# Patient Record
Sex: FEMALE | Race: WHITE | NOT HISPANIC OR LATINO | Employment: UNEMPLOYED | URBAN - METROPOLITAN AREA
[De-identification: names, ages, dates, MRNs, and addresses within clinical notes are randomized per-mention and may not be internally consistent; named-entity substitution may affect disease eponyms.]

---

## 2017-02-12 ENCOUNTER — HOSPITAL ENCOUNTER (INPATIENT)
Facility: MEDICAL CENTER | Age: 22
LOS: 4 days | DRG: 778 | End: 2017-02-17
Attending: OBSTETRICS & GYNECOLOGY | Admitting: OBSTETRICS & GYNECOLOGY
Payer: COMMERCIAL

## 2017-02-12 LAB
A1 MICROGLOB PLACENTAL VAG QL: NEGATIVE
ABO GROUP BLD: NORMAL
ALBUMIN SERPL BCP-MCNC: 3.5 G/DL (ref 3.2–4.9)
ALBUMIN/GLOB SERPL: 1.1 G/DL
ALP SERPL-CCNC: 144 U/L (ref 30–99)
ALT SERPL-CCNC: 6 U/L (ref 2–50)
ANION GAP SERPL CALC-SCNC: 16 MMOL/L (ref 0–11.9)
AST SERPL-CCNC: 12 U/L (ref 12–45)
BILIRUB SERPL-MCNC: 0.9 MG/DL (ref 0.1–1.5)
BLD GP AB SCN SERPL QL: NORMAL
BUN SERPL-MCNC: 4 MG/DL (ref 8–22)
CALCIUM SERPL-MCNC: 9 MG/DL (ref 8.5–10.5)
CHLORIDE SERPL-SCNC: 104 MMOL/L (ref 96–112)
CO2 SERPL-SCNC: 13 MMOL/L (ref 20–33)
CREAT SERPL-MCNC: 0.67 MG/DL (ref 0.5–1.4)
ERYTHROCYTE [DISTWIDTH] IN BLOOD BY AUTOMATED COUNT: 43.9 FL (ref 35.9–50)
GFR SERPL CREATININE-BSD FRML MDRD: >60 ML/MIN/1.73 M 2
GLOBULIN SER CALC-MCNC: 3.3 G/DL (ref 1.9–3.5)
GLUCOSE SERPL-MCNC: 79 MG/DL (ref 65–99)
HCT VFR BLD AUTO: 34.9 % (ref 37–47)
HGB BLD-MCNC: 10.7 G/DL (ref 12–16)
MAGNESIUM SERPL-MCNC: 4.8 MG/DL (ref 1.5–2.5)
MAGNESIUM SERPL-MCNC: 5.2 MG/DL (ref 1.5–2.5)
MCH RBC QN AUTO: 27.4 PG (ref 27–33)
MCHC RBC AUTO-ENTMCNC: 30.7 G/DL (ref 33.6–35)
MCV RBC AUTO: 89.5 FL (ref 81.4–97.8)
PLATELET # BLD AUTO: 149 K/UL (ref 164–446)
PMV BLD AUTO: 11.6 FL (ref 9–12.9)
POTASSIUM SERPL-SCNC: 4.1 MMOL/L (ref 3.6–5.5)
PROT SERPL-MCNC: 6.8 G/DL (ref 6–8.2)
RBC # BLD AUTO: 3.9 M/UL (ref 4.2–5.4)
RH BLD: NORMAL
SODIUM SERPL-SCNC: 133 MMOL/L (ref 135–145)
WBC # BLD AUTO: 17.1 K/UL (ref 4.8–10.8)

## 2017-02-12 PROCEDURE — 700112 HCHG RX REV CODE 229: Performed by: OBSTETRICS & GYNECOLOGY

## 2017-02-12 PROCEDURE — 700111 HCHG RX REV CODE 636 W/ 250 OVERRIDE (IP)

## 2017-02-12 PROCEDURE — 86900 BLOOD TYPING SEROLOGIC ABO: CPT

## 2017-02-12 PROCEDURE — 80053 COMPREHEN METABOLIC PANEL: CPT

## 2017-02-12 PROCEDURE — 85027 COMPLETE CBC AUTOMATED: CPT

## 2017-02-12 PROCEDURE — 36415 COLL VENOUS BLD VENIPUNCTURE: CPT

## 2017-02-12 PROCEDURE — 84112 EVAL AMNIOTIC FLUID PROTEIN: CPT

## 2017-02-12 PROCEDURE — A9270 NON-COVERED ITEM OR SERVICE: HCPCS | Performed by: OBSTETRICS & GYNECOLOGY

## 2017-02-12 PROCEDURE — 86901 BLOOD TYPING SEROLOGIC RH(D): CPT

## 2017-02-12 PROCEDURE — 59025 FETAL NON-STRESS TEST: CPT | Performed by: OBSTETRICS & GYNECOLOGY

## 2017-02-12 PROCEDURE — 83735 ASSAY OF MAGNESIUM: CPT | Mod: 91

## 2017-02-12 PROCEDURE — 86850 RBC ANTIBODY SCREEN: CPT

## 2017-02-12 PROCEDURE — 4A1HX4Z MONITORING OF PRODUCTS OF CONCEPTION, CARDIAC ELECTRICAL ACTIVITY, EXTERNAL APPROACH: ICD-10-PCS | Performed by: OBSTETRICS & GYNECOLOGY

## 2017-02-12 PROCEDURE — 700111 HCHG RX REV CODE 636 W/ 250 OVERRIDE (IP): Performed by: OBSTETRICS & GYNECOLOGY

## 2017-02-12 PROCEDURE — 700102 HCHG RX REV CODE 250 W/ 637 OVERRIDE(OP): Performed by: OBSTETRICS & GYNECOLOGY

## 2017-02-12 PROCEDURE — 302790 HCHG STAT ANTEPARTUM CARE, DAILY

## 2017-02-12 RX ORDER — VITAMIN A ACETATE, BETA CAROTENE, ASCORBIC ACID, CHOLECALCIFEROL, .ALPHA.-TOCOPHEROL ACETATE, DL-, THIAMINE MONONITRATE, RIBOFLAVIN, NIACINAMIDE, PYRIDOXINE HYDROCHLORIDE, FOLIC ACID, CYANOCOBALAMIN, CALCIUM CARBONATE, FERROUS FUMARATE, ZINC OXIDE, CUPRIC OXIDE 3080; 12; 120; 400; 1; 1.84; 3; 20; 22; 920; 25; 200; 27; 10; 2 [IU]/1; UG/1; MG/1; [IU]/1; MG/1; MG/1; MG/1; MG/1; MG/1; [IU]/1; MG/1; MG/1; MG/1; MG/1; MG/1
1 TABLET, FILM COATED ORAL DAILY
Status: DISCONTINUED | OUTPATIENT
Start: 2017-02-12 | End: 2017-02-17 | Stop reason: HOSPADM

## 2017-02-12 RX ORDER — AMOXICILLIN 250 MG
1 CAPSULE ORAL 2 TIMES DAILY
Status: DISCONTINUED | OUTPATIENT
Start: 2017-02-12 | End: 2017-02-17 | Stop reason: HOSPADM

## 2017-02-12 RX ORDER — DOCUSATE SODIUM 100 MG/1
100 CAPSULE, LIQUID FILLED ORAL 2 TIMES DAILY
Status: DISCONTINUED | OUTPATIENT
Start: 2017-02-12 | End: 2017-02-17 | Stop reason: HOSPADM

## 2017-02-12 RX ORDER — AMPICILLIN 2 G/1
INJECTION, POWDER, FOR SOLUTION INTRAVENOUS
Status: COMPLETED
Start: 2017-02-12 | End: 2017-02-12

## 2017-02-12 RX ORDER — MAGNESIUM SULFATE HEPTAHYDRATE 40 MG/ML
2.5 INJECTION, SOLUTION INTRAVENOUS CONTINUOUS
Status: DISCONTINUED | OUTPATIENT
Start: 2017-02-12 | End: 2017-02-14

## 2017-02-12 RX ORDER — MAGNESIUM SULFATE HEPTAHYDRATE 40 MG/ML
2 INJECTION, SOLUTION INTRAVENOUS ONCE
Status: COMPLETED | OUTPATIENT
Start: 2017-02-12 | End: 2017-02-12

## 2017-02-12 RX ORDER — MAGNESIUM SULFATE HEPTAHYDRATE 40 MG/ML
INJECTION, SOLUTION INTRAVENOUS
Status: COMPLETED
Start: 2017-02-12 | End: 2017-02-12

## 2017-02-12 RX ORDER — ACETAMINOPHEN 325 MG/1
650 TABLET ORAL EVERY 4 HOURS PRN
Status: DISCONTINUED | OUTPATIENT
Start: 2017-02-12 | End: 2017-02-17 | Stop reason: HOSPADM

## 2017-02-12 RX ORDER — BETAMETHASONE SODIUM PHOSPHATE AND BETAMETHASONE ACETATE 3; 3 MG/ML; MG/ML
12 INJECTION, SUSPENSION INTRA-ARTICULAR; INTRALESIONAL; INTRAMUSCULAR; SOFT TISSUE ONCE
Status: COMPLETED | OUTPATIENT
Start: 2017-02-12 | End: 2017-02-12

## 2017-02-12 RX ORDER — SODIUM CHLORIDE, SODIUM LACTATE, POTASSIUM CHLORIDE, CALCIUM CHLORIDE 600; 310; 30; 20 MG/100ML; MG/100ML; MG/100ML; MG/100ML
INJECTION, SOLUTION INTRAVENOUS CONTINUOUS
Status: DISCONTINUED | OUTPATIENT
Start: 2017-02-12 | End: 2017-02-17 | Stop reason: HOSPADM

## 2017-02-12 RX ORDER — AMPICILLIN 2 G/1
2 INJECTION, POWDER, FOR SOLUTION INTRAVENOUS EVERY 6 HOURS
Status: DISCONTINUED | OUTPATIENT
Start: 2017-02-13 | End: 2017-02-14

## 2017-02-12 RX ORDER — CALCIUM GLUCONATE 94 MG/ML
1 INJECTION, SOLUTION INTRAVENOUS PRN
Status: DISCONTINUED | OUTPATIENT
Start: 2017-02-12 | End: 2017-02-15

## 2017-02-12 RX ORDER — SODIUM CHLORIDE, SODIUM LACTATE, POTASSIUM CHLORIDE, CALCIUM CHLORIDE 600; 310; 30; 20 MG/100ML; MG/100ML; MG/100ML; MG/100ML
INJECTION, SOLUTION INTRAVENOUS
Status: COMPLETED
Start: 2017-02-12 | End: 2017-02-12

## 2017-02-12 RX ORDER — ONDANSETRON 2 MG/ML
4 INJECTION INTRAMUSCULAR; INTRAVENOUS EVERY 6 HOURS PRN
Status: DISCONTINUED | OUTPATIENT
Start: 2017-02-12 | End: 2017-02-17 | Stop reason: HOSPADM

## 2017-02-12 RX ORDER — NIFEDIPINE 10 MG/1
10 CAPSULE ORAL EVERY 6 HOURS PRN
COMMUNITY

## 2017-02-12 RX ADMIN — MAGNESIUM SULFATE IN WATER 2.5 G/HR: 40 INJECTION, SOLUTION INTRAVENOUS at 17:11

## 2017-02-12 RX ADMIN — DOCUSATE SODIUM 100 MG: 100 CAPSULE ORAL at 21:17

## 2017-02-12 RX ADMIN — SODIUM CHLORIDE, POTASSIUM CHLORIDE, SODIUM LACTATE AND CALCIUM CHLORIDE: 600; 310; 30; 20 INJECTION, SOLUTION INTRAVENOUS at 17:10

## 2017-02-12 RX ADMIN — ONDANSETRON 4 MG: 2 INJECTION, SOLUTION INTRAMUSCULAR; INTRAVENOUS at 17:33

## 2017-02-12 RX ADMIN — AMPICILLIN SODIUM 2 G: 2 INJECTION, POWDER, FOR SOLUTION INTRAMUSCULAR; INTRAVENOUS at 17:48

## 2017-02-12 RX ADMIN — STANDARDIZED SENNA CONCENTRATE AND DOCUSATE SODIUM 1 TABLET: 8.6; 5 TABLET, FILM COATED ORAL at 21:16

## 2017-02-12 RX ADMIN — AMPICILLIN SODIUM 2 G: 2 INJECTION, POWDER, FOR SOLUTION INTRAMUSCULAR; INTRAVENOUS at 23:56

## 2017-02-12 RX ADMIN — BETAMETHASONE SODIUM PHOSPHATE AND BETAMETHASONE ACETATE 12 MG: 3; 3 INJECTION, SUSPENSION INTRA-ARTICULAR; INTRALESIONAL; INTRAMUSCULAR at 23:02

## 2017-02-12 RX ADMIN — MAGNESIUM SULFATE IN WATER 2 G: 40 INJECTION, SOLUTION INTRAVENOUS at 16:50

## 2017-02-12 RX ADMIN — MAGNESIUM SULFATE IN WATER 2 G: 40 INJECTION, SOLUTION INTRAVENOUS at 16:48

## 2017-02-12 RX ADMIN — MAGNESIUM SULFATE IN WATER 2.5 G/HR: 40 INJECTION, SOLUTION INTRAVENOUS at 23:58

## 2017-02-12 ASSESSMENT — LIFESTYLE VARIABLES
EVER_SMOKED: NEVER
DO YOU DRINK ALCOHOL: NO
ALCOHOL_USE: NO

## 2017-02-12 ASSESSMENT — PAIN SCALES - GENERAL
PAINLEVEL_OUTOF10: 2
PAINLEVEL_OUTOF10: 3

## 2017-02-13 LAB
MAGNESIUM SERPL-MCNC: 5.3 MG/DL (ref 1.5–2.5)
MAGNESIUM SERPL-MCNC: 5.4 MG/DL (ref 1.5–2.5)
MAGNESIUM SERPL-MCNC: 5.7 MG/DL (ref 1.5–2.5)

## 2017-02-13 PROCEDURE — 59025 FETAL NON-STRESS TEST: CPT | Performed by: OBSTETRICS & GYNECOLOGY

## 2017-02-13 PROCEDURE — 83735 ASSAY OF MAGNESIUM: CPT | Mod: 91

## 2017-02-13 PROCEDURE — 700102 HCHG RX REV CODE 250 W/ 637 OVERRIDE(OP): Performed by: OBSTETRICS & GYNECOLOGY

## 2017-02-13 PROCEDURE — 36415 COLL VENOUS BLD VENIPUNCTURE: CPT

## 2017-02-13 PROCEDURE — 700112 HCHG RX REV CODE 229: Performed by: OBSTETRICS & GYNECOLOGY

## 2017-02-13 PROCEDURE — 770002 HCHG ROOM/CARE - OB PRIVATE (112)

## 2017-02-13 PROCEDURE — A9270 NON-COVERED ITEM OR SERVICE: HCPCS | Performed by: OBSTETRICS & GYNECOLOGY

## 2017-02-13 PROCEDURE — 700111 HCHG RX REV CODE 636 W/ 250 OVERRIDE (IP): Performed by: OBSTETRICS & GYNECOLOGY

## 2017-02-13 PROCEDURE — 302790 HCHG STAT ANTEPARTUM CARE, DAILY

## 2017-02-13 RX ORDER — NIFEDIPINE 10 MG/1
10 CAPSULE ORAL EVERY 6 HOURS
Status: DISCONTINUED | OUTPATIENT
Start: 2017-02-13 | End: 2017-02-13

## 2017-02-13 RX ORDER — NIFEDIPINE 10 MG/1
10 CAPSULE ORAL EVERY 6 HOURS
Status: DISCONTINUED | OUTPATIENT
Start: 2017-02-14 | End: 2017-02-17 | Stop reason: HOSPADM

## 2017-02-13 RX ADMIN — Medication 1 TABLET: at 08:54

## 2017-02-13 RX ADMIN — DOCUSATE SODIUM 100 MG: 100 CAPSULE ORAL at 21:32

## 2017-02-13 RX ADMIN — STANDARDIZED SENNA CONCENTRATE AND DOCUSATE SODIUM 1 TABLET: 8.6; 5 TABLET, FILM COATED ORAL at 21:33

## 2017-02-13 RX ADMIN — STANDARDIZED SENNA CONCENTRATE AND DOCUSATE SODIUM 1 TABLET: 8.6; 5 TABLET, FILM COATED ORAL at 09:01

## 2017-02-13 RX ADMIN — MAGNESIUM SULFATE IN WATER 2.5 G/HR: 40 INJECTION, SOLUTION INTRAVENOUS at 07:46

## 2017-02-13 RX ADMIN — AMPICILLIN SODIUM 2 G: 2 INJECTION, POWDER, FOR SOLUTION INTRAMUSCULAR; INTRAVENOUS at 12:10

## 2017-02-13 RX ADMIN — AMPICILLIN SODIUM 2 G: 2 INJECTION, POWDER, FOR SOLUTION INTRAMUSCULAR; INTRAVENOUS at 18:00

## 2017-02-13 RX ADMIN — SODIUM CHLORIDE, POTASSIUM CHLORIDE, SODIUM LACTATE AND CALCIUM CHLORIDE: 600; 310; 30; 20 INJECTION, SOLUTION INTRAVENOUS at 19:50

## 2017-02-13 RX ADMIN — SODIUM CHLORIDE, POTASSIUM CHLORIDE, SODIUM LACTATE AND CALCIUM CHLORIDE: 600; 310; 30; 20 INJECTION, SOLUTION INTRAVENOUS at 05:57

## 2017-02-13 RX ADMIN — DOCUSATE SODIUM 100 MG: 100 CAPSULE ORAL at 08:54

## 2017-02-13 RX ADMIN — AMPICILLIN SODIUM 2 G: 2 INJECTION, POWDER, FOR SOLUTION INTRAMUSCULAR; INTRAVENOUS at 06:00

## 2017-02-13 RX ADMIN — MAGNESIUM SULFATE IN WATER 2.5 G/HR: 40 INJECTION, SOLUTION INTRAVENOUS at 15:48

## 2017-02-13 RX ADMIN — ASPIRIN 81 MG: 81 TABLET ORAL at 08:54

## 2017-02-13 ASSESSMENT — PAIN SCALES - GENERAL: PAINLEVEL_OUTOF10: 0

## 2017-02-13 NOTE — CARE PLAN
Problem: Knowledge Deficit  Goal: Patient/Support Person demonstrates understanding regarding condition, prognosis and treatment needs during the pregnancy  Outcome: PROGRESSING AS EXPECTED  32 4/7 weeks gestation, twins, admitted for PTL.  Questions encouraged/answered.  POC discussed.  Pt and FOB verbalized understanding.    Problem: Risk for Fluid Imbalance  Goal: Promotion of Fluid Balance  Outcome: PROGRESSING SLOWER THAN EXPECTED  Twin gestation, on Magnesium Sulfate for PTL.  History of severe preeclampsia.  Strict I&O monitored.

## 2017-02-13 NOTE — H&P
HISTORY OF PRESENT ILLNESS:  The patient is a 21-year-old  3, para   1-1-0-2 with an estimated date of confinement of 2017 making her 32-4/7   weeks gestational age at the time of her transport from [place].  She is   being transported to [facility] by her obstetrician, Dr. Owen Guevara because of  contractions and cervical change in a diamniotic   dichorionic twin pregnancy.  She is being transported to [facility] in case the   babies were to deliver and they would need the  intensive care unit.    I have been following the patient essentially every 4 weeks with Dr. Guevara.    I saw her last on 2017 at which time the twins were concordant in   growth.  I do not have my ultrasound report at the time of this dictation.    She presented to [facility] at approximately 9:00 p.m. last night   complaining of contractions for several hours.  She had been admitted there at   approximately 28 weeks for the same thing and was eventually sent home on   nifedipine to take as needed for contractions; however, she has not been   taking any nifedipine since that time and did not take any on the evening of   admission.  She was not having contractions on the monitor at that time, she   was admitted; however, a fetal fibronectin returned positive and AmniSure was   negative.  Because the patient was complaining of contractions.  She was given   oral nifedipine 20 mg at the time of admission and placed on 10 mg every 6   hours.  She was given betamethasone at 11:00 p.m. on .  Today,   she has been showing some intermittent contractions and has received an   occasional subQ dose of terbutaline.  Her pelvic exam on admission at [facility] was described as 2 cm dilated, posterior, 20% effaced and vertex at a   -3 station, essentially that exam has not changed, but the patient started   alexandra more and had a little pinkish discharge.  Therefore, she is  being   transferred to [place].    PAST MEDICAL HISTORY:  Significant for a vaginal delivery in  of a male   infant weighing 7 pounds 5 ounces at 37 weeks.  In 2016, she was   transported to [facility] because of severe preeclampsia and was delivered here at   30 weeks' gestational age by  section, a female infant that weighed 2   pounds 8 ounces, but is doing well at this time.  She also a medical history   significant for bipolar depression diagnosed by her psychiatrist, Dr. Prado.    She had been on Prozac, but did not tolerate this well and she did stop it at   the time, she became pregnant.  Dr. Prado recommended she be placed on   Lamictal for this postpartum.    SOCIAL HISTORY:  She is .  The  and the patient's parents do   appear to be involved and supportive.  She is a housewife.  She denies   smoking, drinking, or other illicit drug use.    PRESENT MEDICATIONS:  Prenatal vitamins, folic acid, and aspirin 81 mg per   day.    PRENATAL LABORATORY DATA:  Hematocrit 34.9.  White count at [facility] is 17,000,   although it was 8900 on admission in [place], the elevation is probably   secondary to receiving betamethasone, platelet count 112 in [place] and is   149 on admission here.  She is GBS positive in her urine at the time of her   initial prenatal labs.  She had an abnormal 1-hour Glucola, but a normal   3-hour glucose tolerance test.  Hepatitis B surface antigen negative, HIV   negative, VDRL nonreactive, blood type A positive, antibody screen negative,   and she is rubella immune.    PHYSICAL EXAMINATION:  VITAL SIGNS:  On admission, blood pressure 126/71, heart rate 130,   respirations 14, temperature 36.5.  Weight 92 kilograms.  BMI is 32.8.  CARDIOVASCULAR:  Significant for sinus tachycardia.  No significant murmurs.  LUNGS:  Clear.  NEUROLOGIC:  DTRs are normal.  The patient does have a flat affect, but does   not appear to be in any distress.  ABDOMEN:  Nontender to  palpation.  HEENT:  Negative.  PELVIC:  On admission, another pelvic exam was done by the RN taking care of   her and was reported as 2 cm dilated, thick posterior with a ballottable   vertex presenting but no tension on the lower uterine segment.    Fetal heart rates were reactive x2 and at the time of admission, she was   alexandra every 2-3 minutes and she did state she felt these, but was not   particularly uncomfortable.  She complained of maybe leaking some fluid and so   an AmniSure was repeated here that was negative.    IMPRESSION:  1.  Diamniotic dichorionic twin pregnancy that is well dated at 32-4/7 weeks   gestational age.  2.   contractions.  It is unclear whether she is really in    labor.  There is no evidence of premature ruptured membranes and she has had   no cervical change in the last 24 hours.  3.  GBS positive.  4.  History of bipolar depression, currently on no medication.  She denies   harmful thoughts to herself or to others.    PLAN:  1.  She was transported on magnesium sulfate after a 4 g load and 2 g an hour.    A stat magnesium level done here at [facility] was subtherapeutic at 4.8 and she   received another 2 g bolus of magnesium sulfate and the infusion rate was   increased to 2.5 g per hour.  We will continue to follow mag levels until we   get her in a therapeutic range.  The magnesium sulfate will be continued until   she is in the steroid window which is either 24-48 hours after her second   dose of betamethasone, which will be given tonight.  We will continue IV   ampicillin 2 g q. 6 hours until we stop her magnesium sulfate.  2.  She does not appear to be actively laboring do not anticipate delivery   ____ tonight, so she was given a diet as tolerates.  3.  If she does failed tocolysis, delivery will be by repeat  section.    She had indicated to Dr. Guevara that she wished a bilateral tubal ligation   for sterilization.  This will be readdressed with her  ____ if she needs to be   delivered while here at [facility].  4.  If stable tomorrow, we will obtain a neonatology consult and also an   ultrasound for fetal weights and position.       ____________________________________     MD HADLEY WILSON / NTS    DD:  02/12/2017 18:37:03  DT:  02/12/2017 21:02:12    D#:  [id number]  Job#:  [id number]    cc: ZARINA CARLSON MD

## 2017-02-13 NOTE — PROGRESS NOTES
1900- Report received. Care assumed. Pt feeling mild contractions described as sharp at her ribs, coinciding with her TOCO and significantly better than upon admission to hospital yesterday and arrival to [facility] today. Denies LOF and VB. Reports FM x 2. Tolerated dinner. POC discussed.  1940- Reactive NST x 2. Monitors removed. Encouraged to notify RN if she's ever concerned for FM and monitors can be replaced. FOB at bedside.  2115- Pt not feeling contractions for quite some while. No other concerns.   2230- Call to Dr. Watts for gypsy's betamethasone order- received. Order also received to call with the 2300 magnesium sulfate level.  2315- Call received from lab with mag level 5.2. Result read back. This is WNL for this pt.  2342- Call to Dr. Watts with mag level of 5.2. Orders received to keep magnesium at current rate of 2.5 g/hr.   0200- Sleeping.   0430- Sleeping in NAD.   0600- Sleeping t/o night. Awakened spontaneously during care. Denies contractions or other needs.  0630- 0500 magnesium level not drawn. Phlebotomist notified.

## 2017-02-13 NOTE — PROGRESS NOTES
0700-bedside report given per KADIE Aguirre.  Fetal monitoring started, pt states both babies moving well, denies loss of fluid, vaginal bleeding, or feeling contractions.

## 2017-02-13 NOTE — PROGRESS NOTES
1630- Pt transfer form [place] for PTL, pt is 32.4 weeks pregnant with Twins, pt settled into bed, magnesium running at 2 gm/hr, pt having uc's every 2 minutes and feeling some pressure with them, and pt states she has had some leaking the ambulance ride over  1645- Dr. Watts called orders given to do amnisure, give 2gm mag bolus and then increase mag to 2.5gm/hr, amnisure collected  1650- 2 gm mag bolus   1710- 2.5gm/hr hung, and labs drawn  1730- zofran given for N/V  1750- amp hung  1900- Bedside report given to CATY Francisco RN- poc discussed

## 2017-02-13 NOTE — CARE PLAN
Problem: Risk for Deep Vein Thrombosis/Venous Thromboembolism  Goal: DVT/VTE Prevention Measures in Place  Intervention: Intermittent sequential compression device in place per MD order  SCD in place      Problem: Nutrition Deficit  Goal: Patient will verbalize understanding of individual dietary needs  Intervention: Collaborate with Clinical Dietician  Pt tolerating regular diet well

## 2017-02-14 ENCOUNTER — APPOINTMENT (OUTPATIENT)
Dept: RADIOLOGY | Facility: MEDICAL CENTER | Age: 22
DRG: 778 | End: 2017-02-14
Attending: OBSTETRICS & GYNECOLOGY
Payer: COMMERCIAL

## 2017-02-14 PROCEDURE — 76815 OB US LIMITED FETUS(S): CPT

## 2017-02-14 PROCEDURE — 700112 HCHG RX REV CODE 229: Performed by: OBSTETRICS & GYNECOLOGY

## 2017-02-14 PROCEDURE — 302790 HCHG STAT ANTEPARTUM CARE, DAILY

## 2017-02-14 PROCEDURE — 770002 HCHG ROOM/CARE - OB PRIVATE (112)

## 2017-02-14 PROCEDURE — 700111 HCHG RX REV CODE 636 W/ 250 OVERRIDE (IP): Performed by: OBSTETRICS & GYNECOLOGY

## 2017-02-14 PROCEDURE — 700102 HCHG RX REV CODE 250 W/ 637 OVERRIDE(OP): Performed by: OBSTETRICS & GYNECOLOGY

## 2017-02-14 PROCEDURE — A9270 NON-COVERED ITEM OR SERVICE: HCPCS | Performed by: OBSTETRICS & GYNECOLOGY

## 2017-02-14 RX ADMIN — Medication 1 TABLET: at 10:41

## 2017-02-14 RX ADMIN — AMPICILLIN SODIUM 2 G: 2 INJECTION, POWDER, FOR SOLUTION INTRAMUSCULAR; INTRAVENOUS at 06:25

## 2017-02-14 RX ADMIN — AMPICILLIN SODIUM 2 G: 2 INJECTION, POWDER, FOR SOLUTION INTRAMUSCULAR; INTRAVENOUS at 00:05

## 2017-02-14 RX ADMIN — MAGNESIUM SULFATE IN WATER 2 G/HR: 40 INJECTION, SOLUTION INTRAVENOUS at 00:17

## 2017-02-14 RX ADMIN — DOCUSATE SODIUM 100 MG: 100 CAPSULE ORAL at 20:51

## 2017-02-14 RX ADMIN — NIFEDIPINE 10 MG: 10 CAPSULE, LIQUID FILLED ORAL at 13:20

## 2017-02-14 RX ADMIN — NIFEDIPINE 10 MG: 10 CAPSULE, LIQUID FILLED ORAL at 18:56

## 2017-02-14 RX ADMIN — DOCUSATE SODIUM 100 MG: 100 CAPSULE ORAL at 10:41

## 2017-02-14 RX ADMIN — NIFEDIPINE 10 MG: 10 CAPSULE, LIQUID FILLED ORAL at 07:18

## 2017-02-14 RX ADMIN — ASPIRIN 81 MG: 81 TABLET ORAL at 10:41

## 2017-02-14 ASSESSMENT — LIFESTYLE VARIABLES: DO YOU DRINK ALCOHOL: NO

## 2017-02-14 ASSESSMENT — PAIN SCALES - GENERAL: PAINLEVEL_OUTOF10: 0

## 2017-02-14 NOTE — PROGRESS NOTES
"0700 -  report received from CATY Francisco RN, care assumed. Twin Gestation today at 32.6 weeks    Patient in bed awake watching television with her mother \"curt\" at the  awake and talking on the telephone. Patient is not expecting mote visitors today, FOB \"Kagen\" is involved however he works in [employer]. Reports sleep last night, denies contractions/discomfort, denies ill feeling, reports frequent FM, no ROM no Bleeding, change to vision/edema/HA; states she has been getting up to the [facility] herself without assist and occasionally to the BR to attempt a BM - no BM since 02/12/2017, patient encouraged not to strain or push. Discussed food/water and medications to encourage a BM. Denies dizzyiness or weakness.    Patient would like to take a shower later today, discussed recent discontinue of magnesium sulfate (this hour); plan to call Dr. Watts for order/approval after 2 - 3 hours off magnesium to report status with request. Patient agrees with plan.     [place] in place, discussed placement of EFM. Discussed POC, pleasant/quiet affect/mood, denies questions/concerns regarding care since arrival to [facility]. States understanding of POC/expectations. Patient encouraged to call RN with all questions/concerns/needs. Dry erase board updated with RN contact info.with review.    1145 - Patient reports BM \"normal\" will plan to continue colace and hold vanesa colace. US arriving to room at this time asking for verification of US order of complete OB, patient reports to MDSave tech that she has had an US a month ago.   1200 - Call to Dr. Watts, physician would like US for position and weights of Twins. Order received for patient to shower. Discussed with patient. Patient to call RN when she is ready for a shower and linen change.    1830 - Dr. Watts at  discussing current status and POC. Questions/concerns addressed.     1900 - Report to Bailey MARI RN    "

## 2017-02-14 NOTE — PROGRESS NOTES
MFM NOTE:  32W5D  PTL twins  Patient had an uneventful night.  Contractions eventually went away and she is only having a few each hour since.  FHR's are reactive.  Her mg++ serum levels have been stable around 5.4  She is tolerating the Mag infusion and not having any side effects.  Second dose of Betamethasone was given at 11 pm last night.  Plan:  Decrease mag to 2 grams per hour at 2400 and will turn off tomorrow at 0700.  We will stop the ampicillin at that time also  Start nifedipine 10mg q 6 hours when mag turned off.  Ultrasound tomorrow for EFW's.    FTF time 35 minutes

## 2017-02-14 NOTE — CARE PLAN
Problem: Safety  Goal: Will remain free from injury  Outcome: PROGRESSING AS EXPECTED  Patient/Family instructed to call RN with any spills, cords in the way on the floor or any other safety hazards. Patient/Family also instructed to call RN with change to LOC, feelings of dizziness, blurry vision or any change to comfort or concern for safety. Medication administered as ordered, verified with patient/scanned in to MAR and armband on patient.     Problem: Knowledge Deficit  Goal: Knowledge of disease process/condition, treatment plan, diagnostic tests, and medications will improve  Outcome: PROGRESSING AS EXPECTED  Discussion regarding POC, expectations while in Antepartum. Encourage patient to express feeling and questions regarding care since arrival to [facility] for clarification.

## 2017-02-14 NOTE — CARE PLAN
Problem: Risk for Deep Vein Thrombosis/Venous Thromboembolism  Goal: DVT/VTE Prevention Measures in Place  Outcome: PROGRESSING AS EXPECTED  Pregnant  BSC privileges today.  Wearing SCDs in bed.   No s/s DVT.    Problem: Fluid Volume:  Goal: Will maintain balanced intake and output  Outcome: PROGRESSING AS EXPECTED  On magnesium sulfate for PTL.  Diaz cath removed.  Using BSC.  I&O monitored.

## 2017-02-14 NOTE — PROGRESS NOTES
1900- Report received. Care assumed. Pt denies ctx, LOF, and VB. Reports +FM. Denies needs or concerns.  1915- Dr. Watts at bedside. Orders received.   1947- Reactive x 2. EFM removed.   0000- Sleeping. No complaints.  0500- No changes. Not feeling contractions.    0700- Report to LIZETH العراقي RN

## 2017-02-15 LAB
BASOPHILS # BLD AUTO: 0.2 % (ref 0–1.8)
BASOPHILS # BLD: 0.02 K/UL (ref 0–0.12)
EOSINOPHIL # BLD AUTO: 0.04 K/UL (ref 0–0.51)
EOSINOPHIL NFR BLD: 0.5 % (ref 0–6.9)
ERYTHROCYTE [DISTWIDTH] IN BLOOD BY AUTOMATED COUNT: 44.3 FL (ref 35.9–50)
HCT VFR BLD AUTO: 29.1 % (ref 37–47)
HGB BLD-MCNC: 9 G/DL (ref 12–16)
IMM GRANULOCYTES # BLD AUTO: 0.06 K/UL (ref 0–0.11)
IMM GRANULOCYTES NFR BLD AUTO: 0.7 % (ref 0–0.9)
LYMPHOCYTES # BLD AUTO: 2.3 K/UL (ref 1–4.8)
LYMPHOCYTES NFR BLD: 27.3 % (ref 22–41)
MCH RBC QN AUTO: 27.8 PG (ref 27–33)
MCHC RBC AUTO-ENTMCNC: 30.9 G/DL (ref 33.6–35)
MCV RBC AUTO: 89.8 FL (ref 81.4–97.8)
MONOCYTES # BLD AUTO: 0.89 K/UL (ref 0–0.85)
MONOCYTES NFR BLD AUTO: 10.6 % (ref 0–13.4)
NEUTROPHILS # BLD AUTO: 5.1 K/UL (ref 2–7.15)
NEUTROPHILS NFR BLD: 60.7 % (ref 44–72)
NRBC # BLD AUTO: 0 K/UL
NRBC BLD AUTO-RTO: 0 /100 WBC
PLATELET # BLD AUTO: 136 K/UL (ref 164–446)
PMV BLD AUTO: 11.4 FL (ref 9–12.9)
RBC # BLD AUTO: 3.24 M/UL (ref 4.2–5.4)
WBC # BLD AUTO: 8.4 K/UL (ref 4.8–10.8)

## 2017-02-15 PROCEDURE — A9270 NON-COVERED ITEM OR SERVICE: HCPCS | Performed by: OBSTETRICS & GYNECOLOGY

## 2017-02-15 PROCEDURE — 85025 COMPLETE CBC W/AUTO DIFF WBC: CPT

## 2017-02-15 PROCEDURE — 59025 FETAL NON-STRESS TEST: CPT | Performed by: OBSTETRICS & GYNECOLOGY

## 2017-02-15 PROCEDURE — 302790 HCHG STAT ANTEPARTUM CARE, DAILY

## 2017-02-15 PROCEDURE — 770002 HCHG ROOM/CARE - OB PRIVATE (112)

## 2017-02-15 PROCEDURE — 36415 COLL VENOUS BLD VENIPUNCTURE: CPT

## 2017-02-15 PROCEDURE — 700102 HCHG RX REV CODE 250 W/ 637 OVERRIDE(OP): Performed by: OBSTETRICS & GYNECOLOGY

## 2017-02-15 PROCEDURE — 700112 HCHG RX REV CODE 229: Performed by: OBSTETRICS & GYNECOLOGY

## 2017-02-15 RX ADMIN — NIFEDIPINE 10 MG: 10 CAPSULE, LIQUID FILLED ORAL at 08:00

## 2017-02-15 RX ADMIN — NIFEDIPINE 10 MG: 10 CAPSULE, LIQUID FILLED ORAL at 01:05

## 2017-02-15 RX ADMIN — STANDARDIZED SENNA CONCENTRATE AND DOCUSATE SODIUM 1 TABLET: 8.6; 5 TABLET, FILM COATED ORAL at 20:54

## 2017-02-15 RX ADMIN — NIFEDIPINE 10 MG: 10 CAPSULE, LIQUID FILLED ORAL at 13:29

## 2017-02-15 RX ADMIN — DOCUSATE SODIUM 100 MG: 100 CAPSULE ORAL at 09:09

## 2017-02-15 RX ADMIN — STANDARDIZED SENNA CONCENTRATE AND DOCUSATE SODIUM 1 TABLET: 8.6; 5 TABLET, FILM COATED ORAL at 09:09

## 2017-02-15 RX ADMIN — ASPIRIN 81 MG: 81 TABLET ORAL at 09:12

## 2017-02-15 RX ADMIN — NIFEDIPINE 10 MG: 10 CAPSULE, LIQUID FILLED ORAL at 19:14

## 2017-02-15 RX ADMIN — DOCUSATE SODIUM 100 MG: 100 CAPSULE ORAL at 20:54

## 2017-02-15 RX ADMIN — Medication 1 TABLET: at 09:09

## 2017-02-15 ASSESSMENT — COPD QUESTIONNAIRES
DO YOU EVER COUGH UP ANY MUCUS OR PHLEGM?: NO/ONLY WITH OCCASIONAL COLDS OR INFECTIONS
DO YOU EVER COUGH UP ANY MUCUS OR PHLEGM?: NO/ONLY WITH OCCASIONAL COLDS OR INFECTIONS
HAVE YOU SMOKED AT LEAST 100 CIGARETTES IN YOUR ENTIRE LIFE: NO/DON'T KNOW
COPD SCREENING SCORE: 0
HAVE YOU SMOKED AT LEAST 100 CIGARETTES IN YOUR ENTIRE LIFE: NO/DON'T KNOW
DURING THE PAST 4 WEEKS HOW MUCH DID YOU FEEL SHORT OF BREATH: NONE/LITTLE OF THE TIME

## 2017-02-15 ASSESSMENT — PATIENT HEALTH QUESTIONNAIRE - PHQ9
SUM OF ALL RESPONSES TO PHQ QUESTIONS 1-9: 0
1. LITTLE INTEREST OR PLEASURE IN DOING THINGS: NOT AT ALL
2. FEELING DOWN, DEPRESSED, IRRITABLE, OR HOPELESS: NOT AT ALL
SUM OF ALL RESPONSES TO PHQ9 QUESTIONS 1 AND 2: 0

## 2017-02-15 ASSESSMENT — LIFESTYLE VARIABLES: DO YOU DRINK ALCOHOL: NO

## 2017-02-15 NOTE — CARE PLAN
Problem: Knowledge Deficit  Goal: Patient/Support Person demonstrates understanding regarding condition, prognosis and treatment needs during the pregnancy  Outcome: PROGRESSING AS EXPECTED  POC was discussed with pt and it seems as pt understands the plan.

## 2017-02-15 NOTE — PROGRESS NOTES
1900 Report received, assessment done. Pt is comfortable and gets up to the bathroom on her own with no assisstance. Pt understands the plan of care and is in good spirit. Pt is not feeling any pain or contractions and only irritability is recorded by toco.

## 2017-02-15 NOTE — PROGRESS NOTES
Pt had a very comfortable quiet night with no complaints. Both babies were reactive and pt had very few contractions with some irritability. Pt was up to the BR with no assist.

## 2017-02-15 NOTE — CARE PLAN
Problem: Skin Integrity  Goal: Skin Integrity is maintained or improved  Outcome: PROGRESSING AS EXPECTED  Pt does move from side to side and up to BR and has no injury to her skin.

## 2017-02-15 NOTE — PROGRESS NOTES
Assumed care of pt., pt sleeping, did not disturb  0800: Pt AAO, denies pain or UC. POC discussed, pt and pt's mom verbalized understanding. All needs met at this time

## 2017-02-15 NOTE — PROGRESS NOTES
MFM NOTE:  32W6D   labor  Magnesium was stopped this morning and she was started on Nifedipine 10 mg po q 6 hours.  She is tolerating the nifedipine well without headache or low blood pressure.  She is having a few contractions but nothing regular or none that she feels very much.  FHR's are reactive, reassuring  Patient up and showered and had BM  Will see what happens in regard to contractions over next 24-48 hours before deciding when to discharge her; especially since she lives in [facility].

## 2017-02-16 PROCEDURE — 770002 HCHG ROOM/CARE - OB PRIVATE (112)

## 2017-02-16 PROCEDURE — 59025 FETAL NON-STRESS TEST: CPT | Performed by: OBSTETRICS & GYNECOLOGY

## 2017-02-16 PROCEDURE — A9270 NON-COVERED ITEM OR SERVICE: HCPCS | Performed by: OBSTETRICS & GYNECOLOGY

## 2017-02-16 PROCEDURE — 700102 HCHG RX REV CODE 250 W/ 637 OVERRIDE(OP): Performed by: OBSTETRICS & GYNECOLOGY

## 2017-02-16 PROCEDURE — 302790 HCHG STAT ANTEPARTUM CARE, DAILY

## 2017-02-16 RX ADMIN — Medication 1 TABLET: at 08:38

## 2017-02-16 RX ADMIN — NIFEDIPINE 10 MG: 10 CAPSULE, LIQUID FILLED ORAL at 01:15

## 2017-02-16 RX ADMIN — ASPIRIN 81 MG: 81 TABLET ORAL at 08:38

## 2017-02-16 RX ADMIN — NIFEDIPINE 10 MG: 10 CAPSULE, LIQUID FILLED ORAL at 07:00

## 2017-02-16 RX ADMIN — NIFEDIPINE 10 MG: 10 CAPSULE, LIQUID FILLED ORAL at 13:18

## 2017-02-16 RX ADMIN — NIFEDIPINE 10 MG: 10 CAPSULE, LIQUID FILLED ORAL at 18:52

## 2017-02-16 ASSESSMENT — LIFESTYLE VARIABLES: DO YOU DRINK ALCOHOL: NO

## 2017-02-16 ASSESSMENT — COPD QUESTIONNAIRES
DO YOU EVER COUGH UP ANY MUCUS OR PHLEGM?: NO/ONLY WITH OCCASIONAL COLDS OR INFECTIONS
COPD SCREENING SCORE: 0
HAVE YOU SMOKED AT LEAST 100 CIGARETTES IN YOUR ENTIRE LIFE: NO/DON'T KNOW
DURING THE PAST 4 WEEKS HOW MUCH DID YOU FEEL SHORT OF BREATH: NONE/LITTLE OF THE TIME

## 2017-02-16 ASSESSMENT — PAIN SCALES - GENERAL: PAINLEVEL_OUTOF10: 0

## 2017-02-16 NOTE — CARE PLAN
Problem: Infection  Goal: Will remain free from infection  Outcome: PROGRESSING AS EXPECTED  Pt continues to be free from signs of infection  Intervention: Assess signs and symptoms of infection  Pt continues to be free from signs and symptoms of infection

## 2017-02-16 NOTE — PROGRESS NOTES
MFM NOTE 33W0D today  Twins with  labor/ contractions    Patient has had a quiet day.  No real episodes of contractions but does have a few runs or uterine irritability.  Her blood pressures have not been too low with the nifedipine she has been receiving q 6 hours.    Plan:  I will check her tomorrow and if her cervical exam is the same I plan to send her home on  in the morning.  Her family is arranging care so she can be at complete bedrest at home ( which she was unable to do because of two young children ).

## 2017-02-16 NOTE — CARE PLAN
Problem: Risk for Infection, Impaired Wound Healing  Goal: Remain free from signs and symptoms of infection  Outcome: PROGRESSING AS EXPECTED  Pt remains afebrile with no other s/s of infection.    Problem: Risk for injury  Goal: Patient and fetus will be free of preventable injury/complications  Outcome: PROGRESSING AS EXPECTED  Pt placed on EFM (x2) and TOCO to monitor for fetal well being and uterine activity.

## 2017-02-16 NOTE — PROGRESS NOTES
0700- Bedside report received from LEVI Villarreal RN- poc discussed  0800- pt resting no c/o pain, bleeding, LOF, uc's, +FM  0828- monitors placed  0900- morning meds given  0910- reactive nst done, monitors off  1200- pt resting no complaints  1600- pt resting  1900- Bedside report given to MAGDY Lowry RN- poc discussed

## 2017-02-16 NOTE — PROGRESS NOTES
1900 received bedside report from RICHARD Rossi RN, assumed care. Pt resting comfortably in bed, POC discussed, pt reports positive fetal movement of both babies, denies leaking of fluid or feeling painful contractions. Plan is for pt to be discharged home on Friday if pt remains stable. Dr. Watts at bedside to discuss POC with pt, all questions answered, pt verbalizes understanding. Pt's mother in room with patient and both pt and pt's mother encouraged to call RN prn.  1920 assessment completed, pt placed on EFM (x2) and TOCO to obtain nightly NST, VSS, pt A&Ox4 and appropriate.   2001 pt off monitors, remains on cont TOCO. Encouraged to call RN for needs wants or desires.  2054 pm meds administered per MAR-VSS, pt resting comfortably in bed, denies feeling contractions.  0115 RN at bedside to administer Nifedipine per MAR-VSS, pt sleeping soundly, denies contractions or leaking of fluid, reports +FM. Will continue to monitor.   0700 report given to day shift RN, assumed care.

## 2017-02-17 VITALS
RESPIRATION RATE: 16 BRPM | BODY MASS INDEX: 32.62 KG/M2 | WEIGHT: 203 LBS | DIASTOLIC BLOOD PRESSURE: 53 MMHG | TEMPERATURE: 97.6 F | HEIGHT: 66 IN | OXYGEN SATURATION: 95 % | HEART RATE: 92 BPM | SYSTOLIC BLOOD PRESSURE: 109 MMHG

## 2017-02-17 PROCEDURE — 700102 HCHG RX REV CODE 250 W/ 637 OVERRIDE(OP): Performed by: OBSTETRICS & GYNECOLOGY

## 2017-02-17 PROCEDURE — 59025 FETAL NON-STRESS TEST: CPT | Performed by: OBSTETRICS & GYNECOLOGY

## 2017-02-17 PROCEDURE — A9270 NON-COVERED ITEM OR SERVICE: HCPCS | Performed by: OBSTETRICS & GYNECOLOGY

## 2017-02-17 RX ADMIN — NIFEDIPINE 10 MG: 10 CAPSULE, LIQUID FILLED ORAL at 07:03

## 2017-02-17 RX ADMIN — NIFEDIPINE 10 MG: 10 CAPSULE, LIQUID FILLED ORAL at 00:13

## 2017-02-17 NOTE — PROGRESS NOTES
1900 Bedside report received from CESAR Otero, IRAIS discussed. Pt discussed with this RN the need to be discharged as early as possible in the morning. PT stated she had discussed this with Dr. Mckinney, RN will follow up.     1920 Dr. Mckinney on floor, updated on pt status. Dr. Mckinney will get pts discharge paperwork ready for the morning.     0010 RN at bedside, pt denies any needs at this time, pt denies UC's, LOF or VB.     0700 Report given to CESAR Otero RN, POC discussed

## 2017-02-17 NOTE — PROGRESS NOTES
0700- Bedside report received from MAGDY Lowry RN- poc discussed  0730- nst complete, pt given discharge instructions, pt verbalized understanding  0745- pt discharged home with

## 2017-02-17 NOTE — PROGRESS NOTES
MFM NOTE:  HD # 5  33W1D DI/DI TWINS   PTL  Vital signs  Temp 36.8 pulse 82-89 104-114 highest -60-80 range  Nifedipine did not need to be held for low pressures.  Lily has tolerated the nifedipine well without complaints or side effects    FHR's reassuring.  Reactive X 2   No decels.  U/S from 2-14 showed twin a to be AUA 33w3d and 2496 grams  Twin B was 34w2d and 2572 grams    Pelvic exam this evenin-2 cm/thick/posterior  VTX -3 station  Hct is 29 and wbc 8.9  plt ct is 136,000    Plan D/C home early tomorrow morning when her  arrives to take her home.  She is to continue the nifedipine 10 mg q 6 hours until 37 weeks  Bed rest at home  Follow-up with Dr. Guevara in 10-14 days  She has a return appt with Dr. Watts for 3-13 ar 1:30 pm for fetal growth  Recommend that she have NST's q week at [facility] until delivery    FTF time      35 minutes

## 2017-04-26 NOTE — DISCHARGE SUMMARY
ADMISSION DIAGNOSES:  1.  Diamniotic dichorionic twin pregnancy at 32-4/7 weeks' gestational age.  2.   contractions, possible  labor.    DISCHARGE DIAGNOSES:  1.  Diamniotic dichorionic twin pregnancy at 33-2/7 weeks' gestational age,   discharged home undelivered.  2.  Threatened  labor, resolved.    PROCEDURES:  1.  Magnesium sulfate tocolysis.   2.  Obstetrical ultrasound.    HISTORY OF PRESENT ILLNESS:  The patient is a 21-year-old  3, para   1-1-0-2 with an estimated due date of 2017, who is transported from home   [facility] [place] by her private obstetrician, Dr. Owen Guevara, because of  contractions and some cervical change.    She was transported to [facility] because [facility] has a    intensive care unit in case the twins were to deliver.  Please see the   remainder of my history and physical for details of the history of present   illness.    HOSPITAL PROGRESS:  The patient was transported on magnesium sulfate   tocolysis.  She received her first dose of betamethasone to enhance fetal lung   maturity at [facility] and was started on intravenous ampicillin for   group B strep prophylaxis.    When she arrived at [facility], she was still having occasional   contractions, so her magnesium sulfate was continued.  She received her second   dose of betamethasone the night of her transport to [facility].  Her GBS culture   was positive, so the ampicillin was continued until her tocolysis was stopped.    She was continued on magnesium sulfate for 24 hours after the second dose of   betamethasone, so that the twins would be in a steroid window in case she   delivered.  On , she had an ultrasound performed at [facility], which   showed twin A to be in a vertex presentation, weighing 2496 g; twin B was in a   transverse position, weighing 2572 g.  Both twins average gestational age and   weights were greater than the  50th percentile for gestational age.  The fetal   heart rate tracings were category 1 and reactive throughout her admission.    On hospital day #3, at [facility], the magnesium sulfate was stopped and she was   started on nifedipine 10 mg p.o. q. 6 hours for tocolysis and to prevent   resumption of uterine contractions because she lives 4 hours from [facility].  She   was watched an additional 48 hours to be sure that she did not resume    contractions or labor.  Her pelvic exam, the day before discharge remained   unchanged at 1-2 cm dilated, 20% effaced.  The cervix was posterior and the   twin A's head was -3 station.  She was discharged home on the morning of   .  Her discharge hematocrit was 29%, this was slightly lower than   admission.  She had been diagnosed with iron deficiency anemia.  Her platelet   count was 136,000, but stable and this was consistent with gestational   thrombocytopenia.  Her white count remained normal at 8.9.    DISCHARGE INSTRUCTIONS:  Include:  1.  She is discharged home on modified bed rest.  She is to remain at pelvic   rest.  2.  She is to continue nifedipine 10 mg q. 6 hours until 36 weeks' gestational   age.    DISCHARGE MEDICATIONS:  1.  Nifedipine 10 mg p.o. q. 6 hours.  2.  Prenatal vitamins.  3.  Iron sulfate 325 mg p.o. daily.    FOLLOWUP:  She was told to follow up with her obstetrician, Dr. Owen Carlson, in 10-14 days.  She already has an appointment at my office on    for a followup growth ultrasound for the twins.       ____________________________________     MD HADLEY WILSON / GRACIE    DD:  2017 20:10:26  DT:  2017 00:09:51    D#:  [id number]  Job#:  [id number]    cc: OWEN CARLSON MD

## 2018-02-09 DIAGNOSIS — R07.89 OTHER CHEST PAIN: ICD-10-CM

## 2018-02-09 LAB — EKG IMPRESSION: NORMAL
